# Patient Record
Sex: MALE | Race: WHITE | NOT HISPANIC OR LATINO
[De-identification: names, ages, dates, MRNs, and addresses within clinical notes are randomized per-mention and may not be internally consistent; named-entity substitution may affect disease eponyms.]

---

## 2022-07-19 ENCOUNTER — NON-APPOINTMENT (OUTPATIENT)
Age: 72
End: 2022-07-19

## 2022-07-19 ENCOUNTER — APPOINTMENT (OUTPATIENT)
Dept: INTERNAL MEDICINE | Facility: CLINIC | Age: 72
End: 2022-07-19

## 2022-07-19 VITALS
DIASTOLIC BLOOD PRESSURE: 83 MMHG | HEART RATE: 72 BPM | SYSTOLIC BLOOD PRESSURE: 126 MMHG | TEMPERATURE: 98.3 F | BODY MASS INDEX: 21.84 KG/M2 | OXYGEN SATURATION: 99 % | HEIGHT: 71 IN | RESPIRATION RATE: 14 BRPM | WEIGHT: 156 LBS

## 2022-07-19 DIAGNOSIS — Z00.00 ENCOUNTER FOR GENERAL ADULT MEDICAL EXAMINATION W/OUT ABNORMAL FINDINGS: ICD-10-CM

## 2022-07-19 DIAGNOSIS — N31.9 NEUROMUSCULAR DYSFUNCTION OF BLADDER, UNSPECIFIED: ICD-10-CM

## 2022-07-19 DIAGNOSIS — Z92.29 PERSONAL HISTORY OF OTHER DRUG THERAPY: ICD-10-CM

## 2022-07-19 DIAGNOSIS — Z86.16 PERSONAL HISTORY OF COVID-19: ICD-10-CM

## 2022-07-19 DIAGNOSIS — E78.5 HYPERLIPIDEMIA, UNSPECIFIED: ICD-10-CM

## 2022-07-19 PROCEDURE — G0402 INITIAL PREVENTIVE EXAM: CPT

## 2022-07-19 PROCEDURE — 93000 ELECTROCARDIOGRAM COMPLETE: CPT | Mod: 59

## 2022-07-19 PROCEDURE — 36415 COLL VENOUS BLD VENIPUNCTURE: CPT

## 2022-07-19 RX ORDER — MULTIVIT-MIN/FOLIC/VIT K/LYCOP 400-300MCG
250 TABLET ORAL
Refills: 0 | Status: ACTIVE | COMMUNITY

## 2022-07-19 RX ORDER — FLUTICASONE PROPIONATE 50 UG/1
50 SPRAY, METERED NASAL
Qty: 16 | Refills: 0 | Status: ACTIVE | COMMUNITY
Start: 2022-01-24

## 2022-07-19 RX ORDER — METHENAMINE HIPPURATE 1 G/1
1 TABLET ORAL TWICE DAILY
Refills: 0 | Status: ACTIVE | COMMUNITY

## 2022-07-19 RX ORDER — ADHESIVE TAPE 3"X 2.3 YD
50 MCG TAPE, NON-MEDICATED TOPICAL
Refills: 0 | Status: ACTIVE | COMMUNITY

## 2022-07-19 NOTE — ASSESSMENT
[FreeTextEntry1] : Stable examination;\par He will continue to follow up with his urologist;\par All labs today\par Will get repeat colonoscopy\par Further plans after review of studies

## 2022-07-19 NOTE — HISTORY OF PRESENT ILLNESS
[FreeTextEntry1] : History of Neurogenic Bladder\par Patient does self cath 4 times a day [de-identified] : Has had Urinary retention since TURP 2019 \par Labs noted\par Increased Cholesterol noted; 01/22\par Hemorrhoid\par Runner\par

## 2022-07-19 NOTE — HEALTH RISK ASSESSMENT
[Fair] :  ~his/her~ mood as fair [Never] : Never [No] : No [No falls in past year] : Patient reported no falls in the past year [0] : 2) Feeling down, depressed, or hopeless: Not at all (0) [Patient reported colonoscopy was normal] : Patient reported colonoscopy was normal [HIV test declined] : HIV test declined [[id number]] : 0 [ColonoscopyDate] : 002/2020

## 2022-07-20 LAB
25(OH)D3 SERPL-MCNC: 46.3 NG/ML
ALBUMIN SERPL ELPH-MCNC: 4.8 G/DL
ALP BLD-CCNC: 64 U/L
ALT SERPL-CCNC: 21 U/L
ANION GAP SERPL CALC-SCNC: 13 MMOL/L
APPEARANCE: CLEAR
AST SERPL-CCNC: 43 U/L
BACTERIA: ABNORMAL
BASOPHILS # BLD AUTO: 0.04 K/UL
BASOPHILS NFR BLD AUTO: 0.5 %
BILIRUB SERPL-MCNC: 0.7 MG/DL
BILIRUBIN URINE: NEGATIVE
BLOOD URINE: NEGATIVE
BUN SERPL-MCNC: 13 MG/DL
CALCIUM SERPL-MCNC: 9.5 MG/DL
CHLORIDE SERPL-SCNC: 98 MMOL/L
CHOLEST SERPL-MCNC: 216 MG/DL
CO2 SERPL-SCNC: 22 MMOL/L
COLOR: NORMAL
CREAT SERPL-MCNC: 0.77 MG/DL
EGFR: 96 ML/MIN/1.73M2
EOSINOPHIL # BLD AUTO: 0.05 K/UL
EOSINOPHIL NFR BLD AUTO: 0.7 %
ESTIMATED AVERAGE GLUCOSE: 111 MG/DL
GLUCOSE QUALITATIVE U: NEGATIVE
GLUCOSE SERPL-MCNC: 95 MG/DL
HBA1C MFR BLD HPLC: 5.5 %
HCT VFR BLD CALC: 45.9 %
HDLC SERPL-MCNC: 88 MG/DL
HGB BLD-MCNC: 15.2 G/DL
HYALINE CASTS: 4 /LPF
IMM GRANULOCYTES NFR BLD AUTO: 0.1 %
KETONES URINE: NEGATIVE
LDLC SERPL CALC-MCNC: 115 MG/DL
LEUKOCYTE ESTERASE URINE: ABNORMAL
LYMPHOCYTES # BLD AUTO: 1.19 K/UL
LYMPHOCYTES NFR BLD AUTO: 16.3 %
MAN DIFF?: NORMAL
MCHC RBC-ENTMCNC: 33.1 GM/DL
MCHC RBC-ENTMCNC: 33.6 PG
MCV RBC AUTO: 101.5 FL
MICROSCOPIC-UA: NORMAL
MONOCYTES # BLD AUTO: 0.59 K/UL
MONOCYTES NFR BLD AUTO: 8.1 %
NEUTROPHILS # BLD AUTO: 5.4 K/UL
NEUTROPHILS NFR BLD AUTO: 74.3 %
NITRITE URINE: NEGATIVE
NONHDLC SERPL-MCNC: 128 MG/DL
PH URINE: 7.5
PLATELET # BLD AUTO: 176 K/UL
POTASSIUM SERPL-SCNC: 4.3 MMOL/L
PROT SERPL-MCNC: 7.1 G/DL
PROTEIN URINE: NEGATIVE
PSA SERPL-MCNC: 3.04 NG/ML
RBC # BLD: 4.52 M/UL
RBC # FLD: 13.2 %
RED BLOOD CELLS URINE: 2 /HPF
SODIUM SERPL-SCNC: 134 MMOL/L
SPECIFIC GRAVITY URINE: 1.01
SQUAMOUS EPITHELIAL CELLS: 0 /HPF
T4 FREE SERPL-MCNC: 1.3 NG/DL
TRIGL SERPL-MCNC: 62 MG/DL
TSH SERPL-ACNC: 1.4 UIU/ML
UROBILINOGEN URINE: NORMAL
WBC # FLD AUTO: 7.28 K/UL
WHITE BLOOD CELLS URINE: 79 /HPF